# Patient Record
Sex: MALE | Race: WHITE | Employment: OTHER | URBAN - METROPOLITAN AREA
[De-identification: names, ages, dates, MRNs, and addresses within clinical notes are randomized per-mention and may not be internally consistent; named-entity substitution may affect disease eponyms.]

---

## 2017-10-02 ENCOUNTER — APPOINTMENT (OUTPATIENT)
Dept: GENERAL RADIOLOGY | Age: 77
End: 2017-10-02
Attending: EMERGENCY MEDICINE
Payer: MEDICARE

## 2017-10-02 ENCOUNTER — HOSPITAL ENCOUNTER (EMERGENCY)
Age: 77
Discharge: HOME | End: 2017-10-02
Attending: EMERGENCY MEDICINE
Payer: MEDICARE

## 2017-10-02 ENCOUNTER — APPOINTMENT (OUTPATIENT)
Dept: CT IMAGING | Age: 77
End: 2017-10-02
Attending: FAMILY MEDICINE
Payer: MEDICARE

## 2017-10-02 VITALS
OXYGEN SATURATION: 100 % | HEART RATE: 77 BPM | WEIGHT: 242 LBS | HEIGHT: 73 IN | RESPIRATION RATE: 16 BRPM | BODY MASS INDEX: 32.07 KG/M2 | TEMPERATURE: 98.1 F | DIASTOLIC BLOOD PRESSURE: 53 MMHG | SYSTOLIC BLOOD PRESSURE: 130 MMHG

## 2017-10-02 DIAGNOSIS — K56.41 FECAL IMPACTION (HCC): ICD-10-CM

## 2017-10-02 DIAGNOSIS — K62.89 PROCTITIS: Primary | ICD-10-CM

## 2017-10-02 LAB
ALBUMIN SERPL-MCNC: 3.9 G/DL (ref 3.4–5)
ALBUMIN/GLOB SERPL: 0.9 {RATIO} (ref 0.8–1.7)
ALP SERPL-CCNC: 67 U/L (ref 45–117)
ALT SERPL-CCNC: 25 U/L (ref 16–61)
ANION GAP SERPL CALC-SCNC: 15 MMOL/L (ref 3–18)
AST SERPL-CCNC: 32 U/L (ref 15–37)
BASOPHILS # BLD: 0 K/UL (ref 0–0.1)
BASOPHILS NFR BLD: 0 % (ref 0–3)
BILIRUB SERPL-MCNC: 1 MG/DL (ref 0.2–1)
BUN SERPL-MCNC: 19 MG/DL (ref 7–18)
BUN/CREAT SERPL: 14 (ref 12–20)
CALCIUM SERPL-MCNC: 9.4 MG/DL (ref 8.5–10.1)
CHLORIDE SERPL-SCNC: 96 MMOL/L (ref 100–108)
CO2 SERPL-SCNC: 24 MMOL/L (ref 21–32)
CREAT SERPL-MCNC: 1.33 MG/DL (ref 0.6–1.3)
DIFFERENTIAL METHOD BLD: ABNORMAL
EOSINOPHIL # BLD: 0 K/UL (ref 0–0.4)
EOSINOPHIL NFR BLD: 0 % (ref 0–5)
ERYTHROCYTE [DISTWIDTH] IN BLOOD BY AUTOMATED COUNT: 13.6 % (ref 11.6–14.5)
GLOBULIN SER CALC-MCNC: 4.3 G/DL (ref 2–4)
GLUCOSE SERPL-MCNC: 141 MG/DL (ref 74–99)
HCT VFR BLD AUTO: 42.8 % (ref 36–48)
HGB BLD-MCNC: 14.7 G/DL (ref 13–16)
LYMPHOCYTES # BLD: 0.7 K/UL (ref 0.8–3.5)
LYMPHOCYTES NFR BLD: 4 % (ref 20–51)
MCH RBC QN AUTO: 31.7 PG (ref 24–34)
MCHC RBC AUTO-ENTMCNC: 34.3 G/DL (ref 31–37)
MCV RBC AUTO: 92.2 FL (ref 74–97)
MONOCYTES # BLD: 0.3 K/UL (ref 0–1)
MONOCYTES NFR BLD: 2 % (ref 2–9)
NEUTS BAND NFR BLD MANUAL: 8 % (ref 0–5)
NEUTS SEG # BLD: 14.8 K/UL (ref 1.8–8)
NEUTS SEG NFR BLD: 86 % (ref 42–75)
PLATELET # BLD AUTO: 257 K/UL (ref 135–420)
PMV BLD AUTO: 10.9 FL (ref 9.2–11.8)
POTASSIUM SERPL-SCNC: 4.2 MMOL/L (ref 3.5–5.5)
PROT SERPL-MCNC: 8.2 G/DL (ref 6.4–8.2)
RBC # BLD AUTO: 4.64 M/UL (ref 4.7–5.5)
RBC MORPH BLD: ABNORMAL
SODIUM SERPL-SCNC: 135 MMOL/L (ref 136–145)
WBC # BLD AUTO: 17.2 K/UL (ref 4.6–13.2)

## 2017-10-02 PROCEDURE — 74022 RADEX COMPL AQT ABD SERIES: CPT

## 2017-10-02 PROCEDURE — 96365 THER/PROPH/DIAG IV INF INIT: CPT

## 2017-10-02 PROCEDURE — 74011636320 HC RX REV CODE- 636/320: Performed by: EMERGENCY MEDICINE

## 2017-10-02 PROCEDURE — 74177 CT ABD & PELVIS W/CONTRAST: CPT

## 2017-10-02 PROCEDURE — 96366 THER/PROPH/DIAG IV INF ADDON: CPT

## 2017-10-02 PROCEDURE — 74011000250 HC RX REV CODE- 250: Performed by: FAMILY MEDICINE

## 2017-10-02 PROCEDURE — 85025 COMPLETE CBC W/AUTO DIFF WBC: CPT | Performed by: EMERGENCY MEDICINE

## 2017-10-02 PROCEDURE — 99285 EMERGENCY DEPT VISIT HI MDM: CPT

## 2017-10-02 PROCEDURE — 80053 COMPREHEN METABOLIC PANEL: CPT | Performed by: EMERGENCY MEDICINE

## 2017-10-02 RX ORDER — MAGNESIUM CITRATE
296 SOLUTION, ORAL ORAL
Status: DISCONTINUED | OUTPATIENT
Start: 2017-10-02 | End: 2017-10-02

## 2017-10-02 RX ORDER — METRONIDAZOLE 500 MG/100ML
500 INJECTION, SOLUTION INTRAVENOUS
Status: COMPLETED | OUTPATIENT
Start: 2017-10-02 | End: 2017-10-02

## 2017-10-02 RX ORDER — METRONIDAZOLE 500 MG/1
500 TABLET ORAL 2 TIMES DAILY
Qty: 14 TAB | Refills: 0 | Status: SHIPPED | OUTPATIENT
Start: 2017-10-02 | End: 2017-10-09

## 2017-10-02 RX ORDER — CIPROFLOXACIN 500 MG/1
500 TABLET ORAL 2 TIMES DAILY
Qty: 14 TAB | Refills: 0 | Status: SHIPPED | OUTPATIENT
Start: 2017-10-02 | End: 2017-10-09

## 2017-10-02 RX ADMIN — METRONIDAZOLE 500 MG: 500 INJECTION, SOLUTION INTRAVENOUS at 18:58

## 2017-10-02 RX ADMIN — IOPAMIDOL 100 ML: 612 INJECTION, SOLUTION INTRAVENOUS at 17:24

## 2017-10-02 NOTE — ED PROVIDER NOTES
[address]  EMERGENCY DEPARTMENT HISTORY AND PHYSICAL EXAM       Date: 10/2/2017   Patient Name: Reyna Mooney   YOB: 1940  Medical Record Number: [id number]    History of Presenting Illness     Chief Complaint   Patient presents with    Constipation        History Provided By:  patient    Additional History: 1:23 PM  Reyna Mooney is a 65 y.o. male who presents to the emergency department via EMS C/O intermittent month long constipation with recent exacerbation onset 2 days ago. \"My bowels don't want to move. There was some pressure, and it just keeps building like it wants to come out. \" Pt eats 2 meals daily and hydrates well. Pt reports he is not eating a high fiber diet. Pt has tried an enema this morning with no relief. NKDA. PMHx of CAD, thyroid disease (on Synthroid), and arthritis. PSHx of cardiac procedure, hernia repair, and right knee replacement. Pt is a former cigarette smoker and a non EtOH user. Pt denies fever, chills, any other associated signs or sx. Lab Results   Component Value Date/Time    TSH 3.42 01/18/2010 10:47 AM        Primary Care Provider: Tonja Damico MD   Specialist:    Past History     Past Medical History:   Past Medical History:   Diagnosis Date    Arthritis     CAD (coronary artery disease) 1986    Thyroid disease         Past Surgical History:   Past Surgical History:   Procedure Laterality Date    CARDIAC SURG PROCEDURE UNLIST  1986    HX HERNIA REPAIR      HX KNEE REPLACEMENT Right         Family History:   History reviewed. No pertinent family history. Social History:   Social History   Substance Use Topics    Smoking status: Former Smoker    Smokeless tobacco: Never Used    Alcohol use No        Allergies:   No Known Allergies     Review of Systems   Review of Systems   Constitutional: Negative for chills and fever.    Gastrointestinal: Positive for constipation.        (+) Rectal fullness   All other systems reviewed and are negative. Physical Exam  Vitals:    10/02/17 1400 10/02/17 1856 10/02/17 1859 10/02/17 1930   BP: 128/69 126/65 129/58 129/64   Pulse:  82 80    Resp:  16 18    Temp:  98.1 °F (36.7 °C)     SpO2: 99%  100% 97%   Weight:       Height:           Physical Exam   Constitutional: He is oriented to person, place, and time. He appears well-developed and well-nourished. Elderly male in NAD. Obese. HENT:   Head: Normocephalic and atraumatic. Eyes: Pupils are equal, round, and reactive to light. Neck: Neck supple. Cardiovascular: Normal rate, regular rhythm, S1 normal, S2 normal and normal heart sounds. Pulmonary/Chest: Breath sounds normal. No respiratory distress. He has no wheezes. He has no rales. He exhibits no tenderness. Abdominal: Soft. He exhibits no distension and no mass. There is no tenderness. There is no guarding. soft and non tender. Genitourinary:   Genitourinary Comments: Rectal  Firmness to the rectal tone on digital exam. Increased firm stool in rectal vault. Brown stool was heme negative. Musculoskeletal: Normal range of motion. He exhibits no edema or tenderness. Neurological: He is alert and oriented to person, place, and time. No cranial nerve deficit. Skin: No rash noted. Psychiatric: He has a normal mood and affect. His behavior is normal. Thought content normal.   Nursing note and vitals reviewed.     Diagnostic Study Results     Labs -      Recent Results (from the past 12 hour(s))   CBC WITH AUTOMATED DIFF    Collection Time: 10/02/17  3:53 PM   Result Value Ref Range    WBC 17.2 (H) 4.6 - 13.2 K/uL    RBC 4.64 (L) 4.70 - 5.50 M/uL    HGB 14.7 13.0 - 16.0 g/dL    HCT 42.8 36.0 - 48.0 %    MCV 92.2 74.0 - 97.0 FL    MCH 31.7 24.0 - 34.0 PG    MCHC 34.3 31.0 - 37.0 g/dL    RDW 13.6 11.6 - 14.5 %    PLATELET 942 946 - 744 K/uL    MPV 10.9 9.2 - 11.8 FL    NEUTROPHILS 86 (H) 42 - 75 %    BAND NEUTROPHILS 8 (H) 0 - 5 %    LYMPHOCYTES 4 (L) 20 - 51 %    MONOCYTES 2 2 - 9 %    EOSINOPHILS 0 0 - 5 %    BASOPHILS 0 0 - 3 %    ABS. NEUTROPHILS 14.8 (H) 1.8 - 8.0 K/UL    ABS. LYMPHOCYTES 0.7 (L) 0.8 - 3.5 K/UL    ABS. MONOCYTES 0.3 0 - 1.0 K/UL    ABS. EOSINOPHILS 0.0 0.0 - 0.4 K/UL    ABS. BASOPHILS 0.0 0.0 - 0.1 K/UL    RBC COMMENTS NORMOCYTIC, NORMOCHROMIC      DF MANUAL     METABOLIC PANEL, COMPREHENSIVE    Collection Time: 10/02/17  3:53 PM   Result Value Ref Range    Sodium 135 (L) 136 - 145 mmol/L    Potassium 4.2 3.5 - 5.5 mmol/L    Chloride 96 (L) 100 - 108 mmol/L    CO2 24 21 - 32 mmol/L    Anion gap 15 3.0 - 18 mmol/L    Glucose 141 (H) 74 - 99 mg/dL    BUN 19 (H) 7.0 - 18 MG/DL    Creatinine 1.33 (H) 0.6 - 1.3 MG/DL    BUN/Creatinine ratio 14 12 - 20      GFR est AA >60 >60 ml/min/1.73m2    GFR est non-AA 52 (L) >60 ml/min/1.73m2    Calcium 9.4 8.5 - 10.1 MG/DL    Bilirubin, total 1.0 0.2 - 1.0 MG/DL    ALT (SGPT) 25 16 - 61 U/L    AST (SGOT) 32 15 - 37 U/L    Alk. phosphatase 67 45 - 117 U/L    Protein, total 8.2 6.4 - 8.2 g/dL    Albumin 3.9 3.4 - 5.0 g/dL    Globulin 4.3 (H) 2.0 - 4.0 g/dL    A-G Ratio 0.9 0.8 - 1.7         Radiologic Studies -  The following have been ordered and reviewed:  CT ABD PELV W CONT   Final Result   IMPRESSION:        1. CT findings at may represent infectious/inflammatory proctitis with mild wall  thickening and moderate perirectal stranding. No findings of large or small  bowel obstruction or perforation.        2. Nonspecific tiny right basilar effusion with right lower lobe parenchymal  opacity, suggestive of atelectasis and scarring. Recommend clinical correlation  to exclude concomitant pneumonia.        3. Mild to moderate mesenteric stranding, chia mesentery. Differential  considerations would include nonspecific infectious/inflammatory etiologies and  lymphangitic congestion. No findings of lymphadenopathy.     4. Small hiatus hernia.     5.  Cholelithiasis without cholecystitis.     6. CT findings of appendicoliths without induration or CT findings of acute  appendicitis.     As read by the radiologist.   XR ABD ACUTE W 1 V CHEST   Final Result   IMPRESSION:  1. Mild blunting of the right costophrenic angle which may reflect a small  pleural effusion or pleural thickening/scarring. 2. Nonobstructive and nonspecific bowel gas pattern, with stool burden in  keeping with the patient's history of constipation. 3. Cholelithiasis.         As read by the radiologist.           Medical Decision Making   I am the first provider for this patient. I reviewed the vital signs, available nursing notes, past medical history, past surgical history, family history and social history. Vital Signs-Reviewed the patient's vital signs. Patient Vitals for the past 12 hrs:   Temp Pulse Resp BP SpO2   10/02/17 1930 - - - 129/64 97 %   10/02/17 1859 - 80 18 129/58 100 %   10/02/17 1856 98.1 °F (36.7 °C) 82 16 126/65 -   10/02/17 1400 - - - 128/69 99 %   10/02/17 1345 - - - 138/70 99 %   10/02/17 1320 97.8 °F (36.6 °C) 84 16 142/67 100 %       Pulse Oximetry Analysis - Normal 100% on room air. Old Medical Records: Nursing notes. Provider Notes:     Procedures:   Procedures    PROCEDURE NOTE - RECTAL EXAM:   1:35 PM  Performed by: Leigha Reyes MD  Rectal exam performed. brown stool was collected. Stool was Hemoccult tested, and found to be heme Negative. The procedure took 1-15 minutes, and pt tolerated well. Written by Bonnie Soria ED Scribe, as dictated by Leigha Reyes MD.    3:35 PM Pt manually disimpacted. Pt still felt like stool is stuck, repeat digital exam with small but firm stool palpated but unable to disimpact. Pt given magnesium citrate, will also get AAS to r/o obstruction and lab to assess electrolyte stataus and hydration. ED Course:  1:23 PM  Initial assessment performed.  The patients presenting problems have been discussed, and they are in agreement with the care plan formulated and outlined with them. I have encouraged them to ask questions as they arise throughout their visit. BEDSIDE TRANSFER OF CARE:  3:44 PM  Patient care will be transferred from Sara Mendez MD to Margaret De Leon MD.  Discussed available diagnostic results and care plan at length at beside. Patient and family made aware of provider change. All questions answered. Patient and family voiced understanding. Written by Ani Garcia, ED Scribe, as dictated by Sara Mendez MD.     4:35 PM   WBC is 17.2. Pt had abdominal pain and constipation. Will get CT of abdomen    8:25 PM  Feeling much better, ready to be discharged. Medications Given in the ED:  Medications   iopamidol (ISOVUE 300) 61 % contrast injection 100 mL (100 mL IntraVENous Given 10/2/17 1444)   metroNIDAZOLE (FLAGYL) IVPB premix 500 mg (500 mg IntraVENous New Bag 10/2/17 2268)       Discharge Note:  8:29 PM  Pt has been reexamined. Patient has no new complaints, changes, or physical findings. Care plan outlined and precautions discussed. Results were reviewed with the patient. All medications were reviewed with the patient; will d/c home. All of pt's questions and concerns were addressed. Patient was instructed and agrees to follow up with PCP, as well as to return to the ED upon further deterioration. Patient is ready to go home. Discussion:  65 y.o. Male presenting with constipation. He was having difficulty passing a BM. WBC was 17. CT showed proctitis. Given IV abx and will be started on oral abx. Diagnosis   Clinical Impression:   1. Proctitis    2.  Fecal impaction ([address]         Follow-up Information     Follow up With Details Comments [address], [place] Schedule an appointment as soon as possible for a visit For Primary Care Follow Up [address] [address] [phone]      [facility] EMERGENCY DEPT Go to As needed, If symptoms worsen [address] [id number]  [phone] Current Discharge Medication List      START taking these medications    Details   ciprofloxacin HCl (CIPRO) 500 mg tablet Take 1 Tab by mouth two (2) times a day for 7 days. Qty: 14 Tab, Refills: 0      metroNIDAZOLE (FLAGYL) 500 mg tablet Take 1 Tab by mouth two (2) times a day for 7 days. Qty: 14 Tab, Refills: 0             _______________________________   Attestations: This note is prepared by Meme Littlejohn, acting as a Scribe for Caryn Orellana MD on 1:18 PM on 10/2/2017. Caryn Orellana MD: The scribe's documentation has been prepared under my direction and personally reviewed by me in its entirety.   _______________________________

## 2017-10-02 NOTE — ED NOTES
Assumed patient care at this time. Patient is noted to be sitting up to position of comfort. Patient denies any c/o pain at this time. Patient reports BM x 2 since soaps suds enema. No s/s distress is noted at this time.

## 2017-10-02 NOTE — ED NOTES
Spoke with Dr. Leeann Holcomb regarding mag citrate order with pt's returned WBC 17.2. States cancel order, will be ordering CT abd at this time.

## 2017-10-03 NOTE — DISCHARGE INSTRUCTIONS
Bowel Blockage (Intestinal Obstruction): Care Instructions  Your Care Instructions  A bowel blockage, also called an intestinal obstruction, can prevent gas, fluids, or solids from moving through the intestines normally. It can cause constipation and, rarely, diarrhea. You may have pain, nausea, vomiting, and cramping. Most of the time, complete blockages require a stay in the hospital and possibly surgery. But if your bowel is only partly blocked, your doctor may tell you to wait until it clears on its own and you are able to pass gas and stool. If so, there are things you can do at home to help make you feel better. If you have had surgery for a bowel blockage, there are things you can do at home to make sure you heal well. You can also make some changes to keep your bowel from becoming blocked again. Follow-up care is a key part of your treatment and safety. Be sure to make and go to all appointments, and call your doctor if you are having problems. It's also a good idea to know your test results and keep a list of the medicines you take. How can you care for yourself at home? If your doctor has told you to wait at home for a blockage to clear on its own:  · Follow your doctor's instructions. These may include eating a liquid diet to avoid complete blockage. · Take your medicines exactly as prescribed. Call your doctor if you think you are having a problem with your medicine. · Put a heating pad set on low on your belly to relieve mild cramps and pain. To prevent another blockage  · Try to eat smaller amounts of food more often. For example, have 5 or 6 small meals throughout the day instead of 2 or 3 large meals. · Chew your food very well. Try to chew each bite about 20 times or until it is liquid. · Avoid high-fiber foods and raw fruits and vegetables with skins, husks, strings, or seeds.  These can form a ball of undigested material that can cause a blockage if a part of your bowel is scarred or narrowed. · Check with your doctor before you eat whole-grain products or use a fiber supplement such as Citrucel or Metamucil. · To help you have regular bowel movements, eat at regular times, do not strain during a bowel movement, and drink at least 8 to 10 glasses of water each day. If you have kidney, heart, or liver disease and have to limit fluids, talk with your doctor or before you increase the amount of fluids you drink. · Drink high-calorie liquid formulas if your doctor says to. Severe symptoms may make it hard for your body to take in vitamins and minerals. · Get regular exercise. It helps you digest your food better. Get at least 30 minutes of physical activity on most days of the week. Walking is a good choice. When should you call for help? Call 911 anytime you think you may need emergency care. For example, call if:  · You passed out (lost consciousness). · You have severe pain or swelling in your belly. · You vomit blood or what looks like coffee grounds. · You pass maroon or very bloody stools. · You cannot pass any stools or gas. Call your doctor now or seek immediate medical care if:  · You have a new or higher fever. · You cannot keep fluids or medicines down. · You have new pain that gets worse when you move or cough. · Your symptoms become much worse than usual.  Watch closely for changes in your health, and be sure to contact your doctor if:  · You do not get better as expected. · You have steady diarrhea for more than 2 weeks. · You've been losing weight. Where can you learn more? Go to http://randy-rose.info/. Enter U104 in the search box to learn more about \"Bowel Blockage (Intestinal Obstruction): Care Instructions. \"  Current as of: August 9, 2016  Content Version: 11.3  © 9304-9133 [employer]. Care instructions adapted under license by [employer] (which disclaims liability or warranty for this information).  If you have questions about a medical condition or this instruction, always ask your healthcare professional. [address] any warranty or liability for your use of this information. Proctitis: Care Instructions  Your Care Instructions  Proctitis is inflammation of the lining of the rectum. It can be a short-term or long-term problem. Many things can cause proctitis. It may be a side effect of medical treatments, such as radiation therapy or antibiotics. Some sexually transmitted diseases may also cause proctitis. It may be related to ulcerative colitis or to Crohn's disease. Other causes include bacterial infection, allergies, or injury or nerve problems in the rectum. Common symptoms include pain or itching in the rectum and a constant or frequent strong need to have a bowel movement. You may have a change in bowel habits; a fever; and mucus, blood, or pus in your stools. Follow-up care is a key part of your treatment and safety. Be sure to make and go to all appointments, and call your doctor if you are having problems. Its also a good idea to know your test results and keep a list of the medicines you take. How can you care for yourself at home? · Take your medicines exactly as prescribed. Call your doctor if you think you are having a problem with your medicine. You will get more details on the specific medicines your doctor prescribes. · If your doctor prescribed antibiotics, take them as directed. Do not stop taking them just because you feel better. You need to take the full course of antibiotics. · Some complementary treatments may help. These include acupuncture, herbal remedies, and diet supplements. Be sure to talk to your doctor before you use any complementary treatment. · Avoid anal intercourse. This will prevent further damage to the anal canal and give it time to heal.  · Avoid foods that seem to make your symptoms worse.  Common problem foods include dairy products, foods and drinks that contain caffeine, and high-fat foods. These foods can irritate the digestive tract and make conditions like ulcerative colitis worse. · Take steps to reduce stress. Exercises such as yoga or chris chi can help you deal with stress. Talk to your doctor about these and other methods of reducing stress. When should you call for help? Call 911 anytime you think you may need emergency care. For example, call if:  · You passed out (lost consciousness). · You pass maroon or very bloody stools. Call your doctor now or seek immediate medical care if:  · You have belly pain that gets worse. · You have fever, chills, or body aches. · You have nausea or vomiting. · Your stools are black and tarlike or have streaks of blood. Watch closely for changes in your health, and be sure to contact your doctor if:  · You do not get better as expected. Where can you learn more? Go to http://randy-rose.info/. Enter V958 in the search box to learn more about \"Proctitis: Care Instructions. \"  Current as of: August 9, 2016  Content Version: 11.3  © 0353-1119 [employer]. Care instructions adapted under license by [employer] (which disclaims liability or warranty for this information). If you have questions about a medical condition or this instruction, always ask your healthcare professional. [address] any warranty or liability for your use of this information.